# Patient Record
(demographics unavailable — no encounter records)

---

## 2024-11-07 NOTE — HEALTH RISK ASSESSMENT
[2 - 4 times a month (2 pts)] : 2-4 times a month (2 points) [No] : In the past 12 months have you used drugs other than those required for medical reasons? No [No falls in past year] : Patient reported no falls in the past year [2] : 2) Feeling down, depressed, or hopeless for more than half of the days (2) [Nearly Every Day (3)] : 6.) Feeling bad about yourself, or that you are a failure, or have let yourself or your family down? Nearly every day [Not at All (0)] : 8.) Moving or speaking so slowly that other people could have noticed, or the opposite, moving or speaking faster than usual? Not at all [Several Days (1)] : 9.) Thoughts that you would be off dead or of hurting yourself in some way? Several days [Moderately Severe] : Severity of Depression is Moderately Severe [Somewhat Difficult] : How difficult have these problems made it for you to do your work, take care of things at home, or get along with people? Somewhat difficult [PHQ-9 Positive] : PHQ-9 Positive [Former] : Former [20 or more] : 20 or more [< 15 Years] : < 15 Years [de-identified] : Currently vapes  [1 or 2 (0 pts)] : 1 or 2 (0 points) [Never (0 pts)] : Never (0 points) [PHQ-2 Positive] : PHQ-2 Positive [I have developed a follow-up plan documented below in the note.] : I have developed a follow-up plan documented below in the note. [Patient/Caregiver not ready to engage] : , patient/caregiver not ready to engage [Audit-CScore] : 2 [de-identified] : Does not excerise.  [de-identified] : States she does not eat that well. [[id number]] : 6 [[id number]] : 15

## 2024-11-07 NOTE — HISTORY OF PRESENT ILLNESS
[FreeTextEntry1] : dry skin; anxiety [de-identified] : 37 year old F with PMH of anxiety, smoker, tumor of R breast, missed  presents to discuss starting ketamine infusions on Monday. Will be getting infusions by Dr. Robson Canada, internist for PTSD and anxiety and depression that have previously failed with meds. Currently on amoxicillin abx course; 4 days left for strep throat. Pt also gets dry patches of skins that have been coming and going for the past few months. Has had a history of eczema in the past but does not follow with dermatology. Pt has not used any OTC treatment. Strep throat symptoms have fully resolved. Sees therapist sometimes for depression. Works part-time for school, but quit currently.

## 2024-11-07 NOTE — REVIEW OF SYSTEMS
[Skin Rash] : skin rash [Insomnia] : insomnia [Anxiety] : anxiety [Depression] : depression [Fever] : no fever [Chills] : no chills [Fatigue] : no fatigue [Pain] : no pain [Redness] : no redness [Dryness] : no dryness  [Vision Problems] : no vision problems [Itching] : no itching [Earache] : no earache [Hearing Loss] : no hearing loss [Hoarseness] : no hoarseness [Nasal Discharge] : no nasal discharge [Sore Throat] : no sore throat [Chest Pain] : no chest pain [Palpitations] : no palpitations [Wheezing] : no wheezing [Cough] : no cough [Abdominal Pain] : no abdominal pain [Nausea] : no nausea [Constipation] : no constipation [Diarrhea] : diarrhea [Vomiting] : no vomiting [Melena] : no melena [Dysuria] : no dysuria [Incontinence] : no incontinence [Frequency] : no frequency [Muscle Weakness] : no muscle weakness [Back Pain] : no back pain [Headache] : no headache [Dizziness] : no dizziness [Suicidal] : not suicidal [FreeTextEntry6] : sob with anxiety  [de-identified] : warts on hand

## 2024-11-07 NOTE — PHYSICAL EXAM
[No Acute Distress] : no acute distress [Well Nourished] : well nourished [Well Developed] : well developed [Well-Appearing] : well-appearing [Normal Sclera/Conjunctiva] : normal sclera/conjunctiva [PERRL] : pupils equal round and reactive to light [EOMI] : extraocular movements intact [Normal Outer Ear/Nose] : the outer ears and nose were normal in appearance [Normal Oropharynx] : the oropharynx was normal [No JVD] : no jugular venous distention [No Lymphadenopathy] : no lymphadenopathy [Supple] : supple [Thyroid Normal, No Nodules] : the thyroid was normal and there were no nodules present [No Respiratory Distress] : no respiratory distress  [No Accessory Muscle Use] : no accessory muscle use [Clear to Auscultation] : lungs were clear to auscultation bilaterally [Normal Rate] : normal rate  [Regular Rhythm] : with a regular rhythm [Normal S1, S2] : normal S1 and S2 [No Murmur] : no murmur heard [No Carotid Bruits] : no carotid bruits [No Abdominal Bruit] : a ~M bruit was not heard ~T in the abdomen [No Varicosities] : no varicosities [Pedal Pulses Present] : the pedal pulses are present [No Edema] : there was no peripheral edema [No Palpable Aorta] : no palpable aorta [No Extremity Clubbing/Cyanosis] : no extremity clubbing/cyanosis [Soft] : abdomen soft [Non Tender] : non-tender [Non-distended] : non-distended [No Masses] : no abdominal mass palpated [No HSM] : no HSM [Normal Bowel Sounds] : normal bowel sounds [Normal Posterior Cervical Nodes] : no posterior cervical lymphadenopathy [Normal Anterior Cervical Nodes] : no anterior cervical lymphadenopathy [No CVA Tenderness] : no CVA  tenderness [No Spinal Tenderness] : no spinal tenderness [Coordination Grossly Intact] : coordination grossly intact [No Focal Deficits] : no focal deficits [Normal Gait] : normal gait [Deep Tendon Reflexes (DTR)] : deep tendon reflexes were 2+ and symmetric [Normal Affect] : the affect was normal [Normal Insight/Judgement] : insight and judgment were intact [de-identified] : small bumps on hand, verrrucous, likely warts

## 2024-11-07 NOTE — ASSESSMENT
[FreeTextEntry1] : Anxiety and Depression - Pt has a history of PTSD, anxiety and depression. States she has upcoming ketamine infusions after trialing several different anti-depressants without success. Was told to follow up with PCP for diagnosis/touch base before starting treatment. - Dr. Robson Canada, internist, will be doing infusions  Warts - patient notes they come and go - will try salycylic acid treatment - possibly derm consult

## 2024-11-07 NOTE — PHYSICAL EXAM
[No Acute Distress] : no acute distress [Well Nourished] : well nourished [Well Developed] : well developed [Well-Appearing] : well-appearing [Normal Sclera/Conjunctiva] : normal sclera/conjunctiva [PERRL] : pupils equal round and reactive to light [EOMI] : extraocular movements intact [Normal Outer Ear/Nose] : the outer ears and nose were normal in appearance [Normal Oropharynx] : the oropharynx was normal [No JVD] : no jugular venous distention [No Lymphadenopathy] : no lymphadenopathy [Supple] : supple [Thyroid Normal, No Nodules] : the thyroid was normal and there were no nodules present [No Respiratory Distress] : no respiratory distress  [No Accessory Muscle Use] : no accessory muscle use [Clear to Auscultation] : lungs were clear to auscultation bilaterally [Normal Rate] : normal rate  [Regular Rhythm] : with a regular rhythm [Normal S1, S2] : normal S1 and S2 [No Murmur] : no murmur heard [No Carotid Bruits] : no carotid bruits [No Abdominal Bruit] : a ~M bruit was not heard ~T in the abdomen [No Varicosities] : no varicosities [Pedal Pulses Present] : the pedal pulses are present [No Edema] : there was no peripheral edema [No Palpable Aorta] : no palpable aorta [No Extremity Clubbing/Cyanosis] : no extremity clubbing/cyanosis [Soft] : abdomen soft [Non Tender] : non-tender [Non-distended] : non-distended [No Masses] : no abdominal mass palpated [No HSM] : no HSM [Normal Bowel Sounds] : normal bowel sounds [Normal Posterior Cervical Nodes] : no posterior cervical lymphadenopathy [Normal Anterior Cervical Nodes] : no anterior cervical lymphadenopathy [No CVA Tenderness] : no CVA  tenderness [No Spinal Tenderness] : no spinal tenderness [Coordination Grossly Intact] : coordination grossly intact [No Focal Deficits] : no focal deficits [Normal Gait] : normal gait [Deep Tendon Reflexes (DTR)] : deep tendon reflexes were 2+ and symmetric [Normal Affect] : the affect was normal [Normal Insight/Judgement] : insight and judgment were intact [de-identified] : small bumps on hand, verrrucous, likely warts

## 2024-11-07 NOTE — HEALTH RISK ASSESSMENT
[2 - 4 times a month (2 pts)] : 2-4 times a month (2 points) [No] : In the past 12 months have you used drugs other than those required for medical reasons? No [No falls in past year] : Patient reported no falls in the past year [2] : 2) Feeling down, depressed, or hopeless for more than half of the days (2) [Nearly Every Day (3)] : 6.) Feeling bad about yourself, or that you are a failure, or have let yourself or your family down? Nearly every day [Not at All (0)] : 8.) Moving or speaking so slowly that other people could have noticed, or the opposite, moving or speaking faster than usual? Not at all [Several Days (1)] : 9.) Thoughts that you would be off dead or of hurting yourself in some way? Several days [Moderately Severe] : Severity of Depression is Moderately Severe [Somewhat Difficult] : How difficult have these problems made it for you to do your work, take care of things at home, or get along with people? Somewhat difficult [PHQ-9 Positive] : PHQ-9 Positive [Former] : Former [20 or more] : 20 or more [< 15 Years] : < 15 Years [de-identified] : Currently vapes  [1 or 2 (0 pts)] : 1 or 2 (0 points) [Never (0 pts)] : Never (0 points) [PHQ-2 Positive] : PHQ-2 Positive [I have developed a follow-up plan documented below in the note.] : I have developed a follow-up plan documented below in the note. [Patient/Caregiver not ready to engage] : , patient/caregiver not ready to engage [Audit-CScore] : 2 [de-identified] : Does not excerise.  [de-identified] : States she does not eat that well. [MKL1Mwtej] : 6 [[id number]] : 15

## 2024-11-07 NOTE — REVIEW OF SYSTEMS
[Skin Rash] : skin rash [Insomnia] : insomnia [Anxiety] : anxiety [Depression] : depression [Fever] : no fever [Chills] : no chills [Fatigue] : no fatigue [Pain] : no pain [Redness] : no redness [Dryness] : no dryness  [Vision Problems] : no vision problems [Itching] : no itching [Earache] : no earache [Hearing Loss] : no hearing loss [Hoarseness] : no hoarseness [Nasal Discharge] : no nasal discharge [Sore Throat] : no sore throat [Chest Pain] : no chest pain [Palpitations] : no palpitations [Wheezing] : no wheezing [Cough] : no cough [Abdominal Pain] : no abdominal pain [Nausea] : no nausea [Constipation] : no constipation [Diarrhea] : diarrhea [Vomiting] : no vomiting [Melena] : no melena [Dysuria] : no dysuria [Incontinence] : no incontinence [Frequency] : no frequency [Muscle Weakness] : no muscle weakness [Back Pain] : no back pain [Headache] : no headache [Dizziness] : no dizziness [Suicidal] : not suicidal [FreeTextEntry6] : sob with anxiety  [de-identified] : warts on hand

## 2024-11-07 NOTE — HISTORY OF PRESENT ILLNESS
[FreeTextEntry1] : dry skin; anxiety [de-identified] : 37 year old F with PMH of anxiety, smoker, tumor of R breast, missed  presents to discuss starting ketamine infusions on Monday. Will be getting infusions by Dr. Robson Canada, internist for PTSD and anxiety and depression that have previously failed with meds. Currently on amoxicillin abx course; 4 days left for strep throat. Pt also gets dry patches of skins that have been coming and going for the past few months. Has had a history of eczema in the past but does not follow with dermatology. Pt has not used any OTC treatment. Strep throat symptoms have fully resolved. Sees therapist sometimes for depression. Works part-time for school, but quit currently.

## 2024-12-17 NOTE — HEALTH RISK ASSESSMENT
[Yes] : Yes [Monthly or less (1 pt)] : Monthly or less (1 point) [1 or 2 (0 pts)] : 1 or 2 (0 points) [Never (0 pts)] : Never (0 points) [No] : In the past 12 months have you used drugs other than those required for medical reasons? No [No falls in past year] : Patient reported no falls in the past year [0] : 2) Feeling down, depressed, or hopeless: Not at all (0) [Former] : Former [20 or more] : 20 or more [< 15 Years] : < 15 Years [Patient reported mammogram was abnormal] : Patient reported mammogram was abnormal [Patient reported PAP Smear was normal] : Patient reported PAP Smear was normal [None] : None [With Family] : lives with family [Unemployed] : unemployed [Single] : single [Feels Safe at Home] : Feels safe at home [Fully functional (bathing, dressing, toileting, transferring, walking, feeding)] : Fully functional (bathing, dressing, toileting, transferring, walking, feeding) [Fully functional (using the telephone, shopping, preparing meals, housekeeping, doing laundry, using] : Fully functional and needs no help or supervision to perform IADLs (using the telephone, shopping, preparing meals, housekeeping, doing laundry, using transportation, managing medications and managing finances) [Very Good] : ~his/her~  mood as very good [PHQ-2 Negative - No further assessment needed] : PHQ-2 Negative - No further assessment needed [Seat Belt] :  uses seat belt [Patient/Caregiver not ready to engage] : , patient/caregiver not ready to engage [Audit-CScore] : 1 [de-identified] : Her 2 sons keep her active. No structured exercise regimen [de-identified] : Inconsistently eats breakfast (eggs and mondragon), skips lunch unless she picks up fast food, home cooked meals for dinner usually chicken, vegetables, salad. [WES1Mhutb] : 0 [de-identified] : Now uses Vape. 1 Vape per week. Often finds herself using the Vape more often than she used cigarettes [Change in mental status noted] : No change in mental status noted [Sexually Active] : not sexually active [Reports changes in hearing] : Reports no changes in hearing [Reports changes in vision] : Reports no changes in vision [MammogramDate] : 1/2018 [MammogramComments] : Right breast Tumor. Benign Pathology. Needs follow up Mammogram. [PapSmearDate] : 2/2024 [de-identified] : 2 sons and mother

## 2024-12-17 NOTE — REVIEW OF SYSTEMS
[Joint Pain] : joint pain [Joint Stiffness] : joint stiffness [Negative] : Psychiatric [Joint Swelling] : no joint swelling [Muscle Weakness] : no muscle weakness [Muscle Pain] : no muscle pain [Back Pain] : no back pain [FreeTextEntry9] : Right wrist and forearm pain with stiffness

## 2024-12-17 NOTE — HISTORY OF PRESENT ILLNESS
[FreeTextEntry1] : Comprehensive Physical exam [de-identified] : 39 y/o female with PMHx anxiety/depression, R breast tumor presenting to the clinic for her annual physical exam. She states that she finished her ketamine infusions 2 weeks ago for her anxiety/depression and they have helped with her mood significantly. She has been feeling great and has been able to improve her sleep significantly since the infusions. She states her only issue today is right wrist pain she has been having since the weather became colder. She states that she has a history of a R radial/ulnar fracture at around 10 years old and now has decreased range of motion and 7/10 achy pain in her right wrist, hand and forearm extending to her elbow. She states cold weather makes the pain worse along with prolonged use. States that it is only her right wrist and does not wake up with pain. She states that compression brace on her wrist helps with the pain and does not use any OTC pain medication. Denies fevers, chills, n/v, CP, SOB, abdominal pain, dysuria, numbness, tingling, or swelling.  Declines Flu vaccine TDAP within the last 10 years  HCM: Pap: earlier this year. normal PHQ2: negative Mammography: Has not scheduled mammogram yet. Has script at home and knows she needs to follow up with her history.

## 2024-12-17 NOTE — ASSESSMENT
[FreeTextEntry1] : 37 y/o female with PMHx anxiety/depression, R breast tumor presenting to the clinic for her annual physical exam.  #HCM - UTD on pap smear.  - History of Right breast tumor. Needs to follow up for Mammogram. Has script at home but will call for mammogram script if she needs a new one. - Declines Flu Vaccine today. TDAP within last 10 years unsure of exactly when. - Former Cigarette smoker, now uses Vape. Counseled on vape use and knows she needs to decrease the amount she uses. - Routine Blood work script given: CBC, CMP, Lipid, A1c, TSH  #Right wrist pain - History of radial/ulnar fracture as a child - Decreased range of motion in right wrist supination - Pain worse in cold and with increased used - Declines PT script now due to time constraints - Possibly interested in Sports Medicine Follow up if the pain continues to get worse - continue using compression brace as needed. NSAIDs as needed.

## 2024-12-17 NOTE — PHYSICAL EXAM
[No Acute Distress] : no acute distress [Well Nourished] : well nourished [Well Developed] : well developed [Well-Appearing] : well-appearing [Normal Sclera/Conjunctiva] : normal sclera/conjunctiva [PERRL] : pupils equal round and reactive to light [EOMI] : extraocular movements intact [Normal Outer Ear/Nose] : the outer ears and nose were normal in appearance [Normal Oropharynx] : the oropharynx was normal [Normal TMs] : both tympanic membranes were normal [No Lymphadenopathy] : no lymphadenopathy [Thyroid Normal, No Nodules] : the thyroid was normal and there were no nodules present [No Respiratory Distress] : no respiratory distress  [No Accessory Muscle Use] : no accessory muscle use [Clear to Auscultation] : lungs were clear to auscultation bilaterally [Normal Rate] : normal rate  [Regular Rhythm] : with a regular rhythm [Normal S1, S2] : normal S1 and S2 [No Edema] : there was no peripheral edema [No Extremity Clubbing/Cyanosis] : no extremity clubbing/cyanosis [Declined Breast Exam] : declined breast exam  [Soft] : abdomen soft [Non Tender] : non-tender [Non-distended] : non-distended [Normal Bowel Sounds] : normal bowel sounds [Normal Anterior Cervical Nodes] : no anterior cervical lymphadenopathy [No Spinal Tenderness] : no spinal tenderness [Grossly Normal Strength/Tone] : grossly normal strength/tone [No Rash] : no rash [No Focal Deficits] : no focal deficits [Normal Affect] : the affect was normal [Normal Insight/Judgement] : insight and judgment were intact [Supple] : supple [No Joint Swelling] : no joint swelling [de-identified] : Right Wrist with decreased range of motion in supination. Strength 5/5 and sensation in tact b/l upper extremities.